# Patient Record
Sex: MALE | Race: OTHER | HISPANIC OR LATINO | URBAN - METROPOLITAN AREA
[De-identification: names, ages, dates, MRNs, and addresses within clinical notes are randomized per-mention and may not be internally consistent; named-entity substitution may affect disease eponyms.]

---
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
## 2025-06-12 NOTE — ED PROVIDER NOTE - RESPIRATORY, MLM
Patient presents with redness, swelling and pain to left upper eyelid x 4 days. Denies vision change.   
No respiratory distress. No stridor, Lungs sounds clear with good aeration bilaterally.

## 2025-07-24 NOTE — ED PROVIDER NOTE - PROGRESS NOTE DETAILS
Airway  Date/Time: 7/24/2025 10:41 AM  Reason: Elective    Airway not difficult    General Information and Staff   Patient location during procedure: OR  Anesthesiologist: Valentina Cota MD  Performed: anesthesiologist   Performed by: Valentina Cota MD  Authorized by: Valentina Cota MD        Indications and Patient Condition  Indications for airway management: anesthesia  Sedation level: deep      Preoxygenated: yesPatient position: sniffing    Mask difficulty assessment: 1 - vent by mask    Final Airway Details    Final airway type: endotracheal airway    Successful airway: ETT  Cuffed: yes   Successful intubation technique: Video laryngoscopy  Facilitating devices/methods: intubating stylet  Endotracheal tube insertion site: oral  Blade: GlideScope  Blade size: #3  ETT size (mm): 7.0    Cormack-Lehane Classification: grade I - full view of glottis  Placement verified by: capnometry   Measured from: lips  ETT to lips (cm): 19  Number of attempts at approach: 1  Ventilation between attempts: none  Number of other approaches attempted: 0    Additional Comments  Atraumatic, dentition intact.       EKG NSR, HR 82, QTc-441 Spoke with PMD, had discussed with family to do EKG with cardiology however they were away so family came to ER.  PMD felt patient was well appearing on exam.  Aware of results and agrees with follow up outpatient with cardiology. Will discharge home with follow up at PMD in 1-2 days and cardiology at next available appointment for routine follow up.  Return if worsening pain, fainting.  -Karen Meredith, PEM Fellow Family discussed and agree to POCUS study.  Bedside ultrasound performed by Matt,Type of Ultrasound performed-cardiac,Indications for ultrasound-chest pain,Findings-good contractility, no effusion.  Follow up study to be ordered- CXR EKG.  -Karen Meredith, PEM Fellow EKG NSR, HR 82, QTc-441.  Orthostatics normal, CXR negative for pneumothorax, no enlarged cardiac silhouette. Spoke with PMD, had discussed with family to do EKG with cardiology however they were away so family came to ER.  PMD felt patient was well appearing on exam.  Aware of results and agrees with follow up outpatient with cardiology. Patient asymptomatic, no dizziness or chest pain.  Will discharge home with follow up at PMD in 1-2 days and cardiology at next available appointment for routine follow up.  Return if worsening pain, fainting.  -Karen Meredith, PEM Fellow Spoke with PMD, had discussed with family to do EKG with cardiology however they were away so family came to ER.  PMD felt patient was well appearing on exam.  Aware of results and agrees with follow up outpatient with cardiology. Patient asymptomatic, no dizziness or chest pain.  Will discharge home with follow up at PMD in 1-2 days and cardiology at next available appointment for routine follow up.  Return if worsening pain, fainting.  -MCKENNA Rodriguez Fellow  Discussed with family by  [id number].